# Patient Record
Sex: MALE | Race: WHITE | NOT HISPANIC OR LATINO | Employment: FULL TIME | URBAN - METROPOLITAN AREA
[De-identification: names, ages, dates, MRNs, and addresses within clinical notes are randomized per-mention and may not be internally consistent; named-entity substitution may affect disease eponyms.]

---

## 2020-09-26 ENCOUNTER — OFFICE VISIT (OUTPATIENT)
Dept: URGENT CARE | Age: 45
End: 2020-09-26
Payer: COMMERCIAL

## 2020-09-26 VITALS
SYSTOLIC BLOOD PRESSURE: 132 MMHG | BODY MASS INDEX: 31.9 KG/M2 | OXYGEN SATURATION: 97 % | DIASTOLIC BLOOD PRESSURE: 60 MMHG | HEIGHT: 76 IN | RESPIRATION RATE: 18 BRPM | TEMPERATURE: 97.7 F | WEIGHT: 262 LBS | HEART RATE: 82 BPM

## 2020-09-26 DIAGNOSIS — H66.91 RIGHT OTITIS MEDIA, UNSPECIFIED OTITIS MEDIA TYPE: Primary | ICD-10-CM

## 2020-09-26 PROCEDURE — 99213 OFFICE O/P EST LOW 20 MIN: CPT | Performed by: NURSE PRACTITIONER

## 2020-09-26 RX ORDER — DEXTROAMPHETAMINE SULFATE, DEXTROAMPHETAMINE SACCHARATE, AMPHETAMINE SULFATE AND AMPHETAMINE ASPARTATE 5; 5; 5; 5 MG/1; MG/1; MG/1; MG/1
CAPSULE, EXTENDED RELEASE ORAL
COMMUNITY
Start: 2020-09-15

## 2020-09-26 RX ORDER — AMOXICILLIN 875 MG/1
875 TABLET, COATED ORAL 2 TIMES DAILY
Qty: 14 TABLET | Refills: 0 | Status: SHIPPED | OUTPATIENT
Start: 2020-09-26 | End: 2020-10-03

## 2020-09-26 NOTE — PROGRESS NOTES
[facility] [employer] [facility]        NAME: Neil Campos is a 50 y o  male  : 1975    MRN: [id number]  DATE: 2020  TIME: 1:20 PM    Assessment and Plan   Right otitis media, unspecified otitis media type [H66 91]  1  Right otitis media, unspecified otitis media type  amoxicillin (AMOXIL) 875 mg tablet         Patient Instructions     Take med as directed  OTC med for pain prn  Follow up with PCP in 3-5 days  Proceed to  ER if symptoms worsen  Chief Complaint     Chief Complaint   Patient presents with    Earache     right ear pain x 3 days          History of Present Illness       HPI   Reports swollen gland of the right ear x 3 days  Also right ear pain  Thinks he may have an ear infection  Review of Systems   Review of Systems   Constitutional: Negative for chills and fever  HENT: Positive for ear pain  Negative for dental problem, ear discharge, facial swelling, hearing loss, rhinorrhea and sore throat  Respiratory: Negative for shortness of breath  Cardiovascular: Negative for chest pain  Gastrointestinal: Negative for nausea and vomiting  Neurological: Negative for dizziness and headaches  Current Medications       Current Outpatient Medications:     ADDERALL XR, 20MG, 20 MG 24 hr capsule, , Disp: , Rfl:     amoxicillin (AMOXIL) 875 mg tablet, Take 1 tablet (875 mg total) by mouth 2 (two) times a day for 7 days, Disp: 14 tablet, Rfl: 0    Current Allergies     Allergies as of 2020    (No Known Allergies)            The following portions of the patient's history were reviewed and updated as appropriate: allergies, current medications, past family history, past medical history, past social history, past surgical history and problem list      Past Medical History:   Diagnosis Date    ADHD (attention deficit hyperactivity disorder)        History reviewed  No pertinent surgical history  History reviewed  No pertinent family history        Medications have been verified  Objective   /60   Pulse 82   Temp 97 7 °F (36 5 °C)   Resp 18   Ht 6' 4" (1 93 m)   Wt 119 kg (262 lb)   SpO2 97%   BMI 31 89 kg/m²        Physical Exam     Physical Exam  Constitutional:       Appearance: He is not ill-appearing  HENT:      Left Ear: Tympanic membrane, ear canal and external ear normal       Ears:      Comments: The right ear canal has mild erythema and has mild swelling  Also the TM has moderate erythema  Bulging  No fluid behind the TM  Cardiovascular:      Rate and Rhythm: Normal rate and regular rhythm  Heart sounds: Normal heart sounds  Pulmonary:      Effort: Pulmonary effort is normal       Breath sounds: Normal breath sounds  Neurological:      Mental Status: He is alert

## 2020-09-26 NOTE — PATIENT INSTRUCTIONS
Otitis Media   WHAT YOU NEED TO KNOW:   Otitis media is an ear infection  DISCHARGE INSTRUCTIONS:   Medicines:  · Ibuprofen or acetaminophen  helps decrease your pain and fever  They are available without a doctor's order  Ask your healthcare provider which medicine is right for you  Ask how much to take and how often to take it  These medicines can cause stomach bleeding if not taken correctly  Ibuprofen can cause kidney damage  Do not take ibuprofen if you have kidney disease, an ulcer, or allergies to aspirin  Acetaminophen can cause liver damage  Do not drink alcohol if you take acetaminophen  · Ear drops  help treat your ear pain  · Antibiotics  help treat a bacterial infection that caused your ear infection  · Take your medicine as directed  Contact your healthcare provider if you think your medicine is not helping or if you have side effects  Tell him or her if you are allergic to any medicine  Keep a list of the medicines, vitamins, and herbs you take  Include the amounts, and when and why you take them  Bring the list or the pill bottles to follow-up visits  Carry your medicine list with you in case of an emergency  Heat or ice:   · Heat  may be used to decrease your pain  Place a warm, moist washcloth on your ear  Apply for 15 to 20 minutes, 3 to 4 times a day    · Ice  helps decrease swelling and pain  Use an ice pack or put crushed ice in a plastic bag  Cover the ice pack with a towel and place it on your ear for 15 to 20 minutes, 3 to 4 times a day for 2 days  Prevent otitis media:   · Wash your hands often  Use soap and water  Wash your hands after you use the bathroom, change a child's diapers, or sneeze  Wash your hands before you prepare or eat food  · Stay away from people who are ill  Some germs are easily and quickly spread through contact  Return to work or school: You may return to work or school when your fever is gone     Follow up with your healthcare provider as directed:  Write down your questions so you remember to ask them during your visits  Contact your healthcare provider if:   · Your ear pain gets worse or does not go away, even after treatment  · The outside of your ear is red or swollen  · You have vomiting or diarrhea  · You have fluid coming from your ear  · You have questions or concerns about your condition or care  Return to the emergency department if:   · You have a seizure  · You have a fever and a stiff neck  © 2017 [facility] Information is for End User's use only and may not be sold, redistributed or otherwise used for commercial purposes  All illustrations and images included in CareNotes® are the copyrighted property of A D [employer] M [employer]  or Raffi Barrios  The above information is an  only  It is not intended as medical advice for individual conditions or treatments  Talk to your doctor, nurse or pharmacist before following any medical regimen to see if it is safe and effective for you

## 2022-05-18 ENCOUNTER — NURSE TRIAGE (OUTPATIENT)
Dept: OTHER | Facility: OTHER | Age: 47
End: 2022-05-18

## 2022-05-18 NOTE — TELEPHONE ENCOUNTER
Reason for Disposition   [1] COVID-19 diagnosed by positive lab test (e g , PCR, rapid self-test kit) AND [2] mild symptoms (e g , cough, fever, others) AND [5] no complications or SOB    Answer Assessment - Initial Assessment Questions  1  COVID-19 DIAGNOSIS: "Who made your COVID-19 diagnosis?" "Was it confirmed by a positive lab test or self-test?" If not diagnosed by a doctor (or NP/PA), ask "Are there lots of cases (community spread) where you live?" Note: See [facility] health department website, if unsure  Symptoms; at home test positive    3  ONSET: "When did the COVID-19 symptoms start?"       2 days ago      6  FEVER: "Do you have a fever?" If Yes, ask: "What is your temperature, how was it measured, and when did it start?"     chills  7  RESPIRATORY STATUS: "Describe your breathing?" (e g , shortness of breath, wheezing, unable to speak)      Breathing is fine    10  VACCINE: "Have you had the COVID-19 vaccine?" If Yes, ask: "Which one, how many shots, when did you get it?"        yes  11  BOOSTER: "Have you received your COVID-19 booster?" If Yes, ask: "Which one and when did you get it?"       no  13   OTHER SYMPTOMS: "Do you have any other symptoms?"  (e g , chills, fatigue, headache, loss of smell or taste, muscle pain, sore throat)        Chills, sweats, cough, tickle in throat, body aches    Protocols used: CORONAVIRUS (COVID-19) [facility]PECT[facility]

## 2022-05-18 NOTE — TELEPHONE ENCOUNTER
Regarding: COVID positive advice   ----- Message from René Salas RN sent at 5/18/2022  9:34 AM EDT -----  "I tested positive for COVID with a rapid test  Are they accurate? Do I need a PCR?  I have congestion, body aches, chills, sweating cough, and throat tickle "

## 2024-02-14 ENCOUNTER — OFFICE VISIT (OUTPATIENT)
Dept: URGENT CARE | Age: 49
End: 2024-02-14
Payer: COMMERCIAL

## 2024-02-14 VITALS
OXYGEN SATURATION: 97 % | WEIGHT: 263.7 LBS | SYSTOLIC BLOOD PRESSURE: 156 MMHG | DIASTOLIC BLOOD PRESSURE: 106 MMHG | RESPIRATION RATE: 18 BRPM | TEMPERATURE: 98.2 F | HEART RATE: 89 BPM | BODY MASS INDEX: 32.1 KG/M2

## 2024-02-14 DIAGNOSIS — M62.830 BACK SPASM: Primary | ICD-10-CM

## 2024-02-14 PROCEDURE — 99213 OFFICE O/P EST LOW 20 MIN: CPT

## 2024-02-14 RX ORDER — TERBINAFINE HYDROCHLORIDE 250 MG/1
250 TABLET ORAL DAILY
COMMUNITY
Start: 2024-01-30

## 2024-02-14 RX ORDER — METHOCARBAMOL 750 MG/1
750 TABLET, FILM COATED ORAL 3 TIMES DAILY PRN
Qty: 21 TABLET | Refills: 0 | Status: SHIPPED | OUTPATIENT
Start: 2024-02-14 | End: 2024-02-21

## 2024-02-14 RX ORDER — AMLODIPINE BESYLATE 5 MG/1
5 TABLET ORAL DAILY
COMMUNITY

## 2024-02-14 NOTE — PROGRESS NOTES
[facility]        NAME: Antoine Yates is a 48 y.o. male  : 1975    MRN: [id number]  DATE: 2024  TIME: 12:40 PM    Assessment and Plan   Back spasm [M62.830]  1. Back spasm  methocarbamol (Robaxin-750) 750 mg tablet      Please begin muscle relaxer as directed-do not drive/operate heavy machinery within 8 hours of taking due to potential for sedation.   May continue Tylenol/Ibuprofen as needed for pain. Topicals such as heat may also be helpful.   Follow up with PCP if no relief within one week.   Present for immediate reevaluation/fo to ED if you notice numbness/tingling of the rectum/genitals or disruption in bowel/bladder function.        Patient Instructions     Muscle Spasm   WHAT YOU NEED TO KNOW:   A muscle spasm is a sudden contraction of any muscle or group of muscles. A muscle cramp is a painful muscle spasm. Muscle cramps commonly occur after intense exercise or during pregnancy. They may also be caused by certain medications, dehydration, low calcium or magnesium levels, or another medical condition.   DISCHARGE INSTRUCTIONS:   Medicines:  You may need the following:  NSAIDs  help decrease swelling and pain or fever. This medicine is available with or without a doctor's order. NSAIDs can cause stomach bleeding or kidney problems in certain people. If you take blood thinner medicine, always ask your healthcare provider if NSAIDs are safe for you. Always read the medicine label and follow directions.     Take your medicine as directed.  Contact your healthcare provider if you think your medicine is not helping or if you have side effects. Tell your provider if you are allergic to any medicine. Keep a list of the medicines, vitamins, and herbs you take. Include the amounts, and when and why you take them. Bring the list or the pill bottles to follow-up visits. Carry your medicine list with you in case of an emergency.     Follow up with your healthcare provider as  directed:  You may need other tests or treatment. You may also be referred to a physical therapist or other specialist. Write down your questions so you remember to ask them during your visits.   Self-care:   Stretch  your muscle to help relieve the cramp. It may be helpful to keep your muscle in the stretched position until the cramp is gone.      Apply heat  to help decrease pain and muscle spasms. Apply heat on the area for 20 to 30 minutes every 2 hours for as many days as directed.      Apply ice  to help decrease swelling and pain. Ice may also help prevent tissue damage. Use an ice pack, or put crushed ice in a plastic bag. Cover it with a towel and place it on your muscle for 15 to 20 minutes every hour or as directed.     Drink more liquids  to help prevent muscle cramps caused by dehydration. Sports drinks may help replace electrolytes you lose through sweat during exercise. Ask your healthcare provider how much liquid to drink each day and which liquids are best for you.      Eat healthy foods , such as fruits, vegetables, whole grains, low-fat dairy products, and lean proteins (meat, beans, and fish). If you are pregnant, ask your healthcare provider about foods that are high in magnesium and sodium. They may help to relieve cramps during pregnancy.      Massage your muscle  to help relieve the cramp.      Take frequent deep breaths  until the cramp feels better. Lie down while you take the deep breaths so you do not get dizzy or lightheaded.     Contact your healthcare provider if:   You have signs of dehydration, such as a headache, dark yellow urine, dry eyes or mouth, or a fast heartbeat.      You have questions or concerns about your condition or care.     Return to the emergency department if:   You have warmth, swelling, or redness in the cramping muscle.      You have frequent or unrelieved muscle cramps in several different muscles.      You have muscle cramps with numbness, tingling, and  burning in your hands and feet.     © Copyright [employer] 2023 Information is for End User's use only and may not be sold, redistributed or otherwise used for commercial purposes.  The above information is an  only. It is not intended as medical advice for individual conditions or treatments. Talk to your doctor, nurse or pharmacist before following any medical regimen to see if it is safe and effective for you.          Follow up with PCP in 3-5 days.  Proceed to  ER if symptoms worsen.    Chief Complaint     Chief Complaint   Patient presents with    Back Pain     Lower R back pain and spasms since skiing 10 - 11 days ago. Pain worst in AM when awakening.         History of Present Illness       Back Pain  This is a new problem. Episode onset: x 10 days. The problem has been gradually worsening since onset. The pain is present in the thoracic spine. The quality of the pain is described as cramping. The pain does not radiate. The pain is moderate. The pain is The same all the time. The symptoms are aggravated by bending, twisting and position. Pertinent negatives include no abdominal pain, bladder incontinence, bowel incontinence, chest pain, dysuria, fever, headaches, leg pain, numbness, paresis, paresthesias, pelvic pain, perianal numbness, tingling, weakness or weight loss. He has tried NSAIDs and heat (Gabapentin) for the symptoms. The treatment provided no relief.       Review of Systems   Review of Systems   Constitutional:  Negative for fatigue, fever and weight loss.   HENT:  Negative for congestion, ear discharge, ear pain, postnasal drip, rhinorrhea, sinus pressure, sinus pain, sneezing and sore throat.    Eyes: Negative.  Negative for pain, discharge, redness and itching.   Respiratory: Negative.  Negative for apnea, cough, choking, chest tightness, shortness of breath, wheezing and stridor.    Cardiovascular: Negative.  Negative for chest pain and palpitations.   Gastrointestinal:  Negative.  Negative for abdominal pain, bowel incontinence, diarrhea, nausea and vomiting.   Endocrine: Negative.  Negative for polydipsia, polyphagia and polyuria.   Genitourinary: Negative.  Negative for bladder incontinence, decreased urine volume, dysuria, flank pain and pelvic pain.   Musculoskeletal:  Positive for back pain. Negative for arthralgias, gait problem, joint swelling, myalgias, neck pain and neck stiffness.   Skin: Negative.  Negative for color change and rash.   Allergic/Immunologic: Negative.  Negative for environmental allergies.   Neurological: Negative.  Negative for dizziness, tingling, facial asymmetry, weakness, light-headedness, numbness, headaches and paresthesias.   Hematological: Negative.  Negative for adenopathy.   Psychiatric/Behavioral: Negative.           Current Medications       Current Outpatient Medications:     methocarbamol (Robaxin-750) 750 mg tablet, Take 1 tablet (750 mg total) by mouth 3 (three) times a day as needed for muscle spasms for up to 7 days, Disp: 21 tablet, Rfl: 0    terbinafine (LamISIL) 250 mg tablet, Take 250 mg by mouth daily, Disp: , Rfl:     ADDERALL XR, 20MG, 20 MG 24 hr capsule, , Disp: , Rfl:     amLODIPine (NORVASC) 5 mg tablet, Take 5 mg by mouth daily, Disp: , Rfl:     Current Allergies     Allergies as of 02/14/2024    (No Known Allergies)            The following portions of the patient's history were reviewed and updated as appropriate: allergies, current medications, past family history, past medical history, past social history, past surgical history and problem list.     Past Medical History:   Diagnosis Date    ADHD (attention deficit hyperactivity disorder)        No past surgical history on file.    No family history on file.      Medications have been verified.        Objective   BP (!) 156/106 (BP Location: Right arm, Patient Position: Sitting, Cuff Size: Large)   Pulse 89   Temp 98.2 °F (36.8 °C) (Oral)   Resp 18   Wt 120 kg (263 lb  11.2 oz)   SpO2 97%   BMI 32.10 kg/m²        Physical Exam     Physical Exam  Vitals reviewed.   Constitutional:       General: He is not in acute distress.     Appearance: Normal appearance. He is not ill-appearing, toxic-appearing or diaphoretic.      Interventions: He is not intubated.  HENT:      Head: Normocephalic and atraumatic.      Right Ear: External ear normal. There is no impacted cerumen.      Left Ear: External ear normal. There is no impacted cerumen.      Nose: Nose normal. No congestion or rhinorrhea.      Mouth/Throat:      Mouth: Mucous membranes are moist.      Pharynx: Oropharynx is clear. Uvula midline. No pharyngeal swelling, oropharyngeal exudate, posterior oropharyngeal erythema or uvula swelling.      Tonsils: No tonsillar exudate or tonsillar abscesses. 1+ on the right. 1+ on the left.   Eyes:      Extraocular Movements: Extraocular movements intact.      Conjunctiva/sclera: Conjunctivae normal.      Pupils: Pupils are equal, round, and reactive to light.   Cardiovascular:      Rate and Rhythm: Normal rate and regular rhythm.      Pulses: Normal pulses.      Heart sounds: Normal heart sounds, S1 normal and S2 normal. Heart sounds not distant. No murmur heard.     No friction rub. No gallop.   Pulmonary:      Effort: Pulmonary effort is normal. No tachypnea, bradypnea, accessory muscle usage, prolonged expiration, respiratory distress or retractions. He is not intubated.      Breath sounds: Normal breath sounds. No stridor, decreased air movement or transmitted upper airway sounds. No decreased breath sounds, wheezing, rhonchi or rales.   Chest:      Chest wall: No tenderness.   Musculoskeletal:         General: Normal range of motion.      Cervical back: Normal range of motion and neck supple. No rigidity or tenderness.      Thoracic back: Spasms present. No swelling, edema, deformity, signs of trauma, lacerations, tenderness or bony tenderness. Normal range of motion. No scoliosis.         Back:    Lymphadenopathy:      Cervical: No cervical adenopathy.   Skin:     General: Skin is warm and dry.      Capillary Refill: Capillary refill takes less than 2 seconds.      Findings: No erythema.   Neurological:      General: No focal deficit present.      Mental Status: He is alert.   Psychiatric:         Mood and Affect: Mood normal.

## 2024-02-14 NOTE — PATIENT INSTRUCTIONS
Please begin muscle relaxer as directed-do not drive/operate heavy machinery within 8 hours of taking due to potential for sedation.   May continue Tylenol/Ibuprofen as needed for pain. Topicals such as heat may also be helpful.   Follow up with PCP if no relief within one week.   Present for immediate reevaluation/fo to ED if you notice numbness/tingling of the rectum/genitals or disruption in bowel/bladder function.